# Patient Record
(demographics unavailable — no encounter records)

---

## 2025-01-16 NOTE — HISTORY OF PRESENT ILLNESS
[FreeTextEntry1] :  #[id number]  50 yo male with nephrolithiasis. States he went to an ED (Jan 8 and 10) and was found to have an obstructing stone. He has passed one large stone (~1 cm). He continues to have severe pain likely from residual stone. He does have some pain on the left. He is voiding well but states he has pain with urination.   This is his first stone event.

## 2025-01-16 NOTE — ASSESSMENT
[FreeTextEntry1] : 49-year-old male with obstructing ureteral stone.  He had passed a recent 1 cm stone.  We discussed the need for updated CT imaging to assess for any residual stones and total stone burden in order to discuss definitive stone management versus medical expulsive therapy.  Plan Urinalysis Urine culture Stone for analysis CT abd/pelvis non con given persistent severe pain to assess for residual stone size location and determine surgical planning Tamsulosin for MET - prescription sent FU after imaging   Patient is being seen today for evaluation and management of a chronic and longitudinal ongoing condition and I am of the primary treating physician.

## 2025-01-30 NOTE — ASSESSMENT
[FreeTextEntry1] : 49-year-old male with obstructing ureteral stone. He was started on tamsulosin and had a CT demonstrating only a 1 mm left intrarenal stone and possible nephrocalcinosis.  We reviewed dietary modifications for stone prevention including increased fluid intake, decreased animal proteins, increase citrus fruit, daily recommended levels of calcium and sodium.  The patient will follow-up for imaging in 8 to 12 months and if there is any significant stone growth we will discuss need for definitive stone management versus undergoing comprehensive metabolic evaluation for stone prevention including a 24-hour urine collection.  Plan Urinalysis reviewed demonstrating gross hematuria which is resolved and likely due to his obstructing stone Urine culture reviewed negative Stone for analysis reviewed: 60% calcium oxalate, 40% uric acid CT abdomen pelvis images reviewed and discussed with patient demonstrating 1 mm left intrarenal stone Stop tamsulosin Follow-up in 8 months for renal ultrasound   Patient is being seen today for evaluation and management of a chronic and longitudinal ongoing condition and I am of the primary treating physician.

## 2025-01-30 NOTE — HISTORY OF PRESENT ILLNESS
[FreeTextEntry1] :  PHIL Lakhani   48 yo male with nephrolithiasis.  He went to the ED on January 8 and 10 and was found to have a 1 cm ureteral stone.  He was started on tamsulosin and was able to pass a stone.  He notes after passing the stone he had some persistent passage of smaller stones and dust.  Currently has no symptoms.  He did undergo CT abdomen pelvis which demonstrated a 1 mm left intrarenal stone and possible nephrocalcinosis.  This is his first stone event.   We discussed dietary modifications for stone prevention

## 2025-03-28 NOTE — HISTORY OF PRESENT ILLNESS
[FreeTextEntry1] :  [id number]  48 yo male with nephrolithiasis.  He went to the ED on January 8 and 10 and was found to have a 1 cm ureteral stone.  He was started on tamsulosin and was able to pass the stone.   He went to the hosptial because he felt he had a stone but was told he had a UTI  He states he feels he has a blockage in his urinary tract like a ball. States his urinary stream is weakened and voids frequently.

## 2025-03-28 NOTE — ASSESSMENT
[FreeTextEntry1] : 49-year-old male with recurrent nephrolithiasis. He also has BPH with CT imaging demonstrating 45-50 g prostate. He recently developed UTI which has been treated but continues to have BPH symptoms. He does also note he feels as if there is a blockage within his urethra. will trial tamsulosin and fu in 1 week for cystoscopy to evaluate urethra.   Plan Urinalysis Urine culture CT imaging reviewed and discussed with patient demonstrating 45-50g prostate Trial tamsulosin 0.4 mg daily - prescription sent FU 1 week for cystoscopy    Patient is being seen today for evaluation and management of a chronic and longitudinal ongoing condition and I am of the primary treating physician.

## 2025-04-04 NOTE — ASSESSMENT
[FreeTextEntry1] : 49-year-old male with recurrent nephrolithiasis. He also has BPH with CT imaging demonstrating 45-50 g prostate. He also has irritative voiding symptoms but found to have meatal stenosis on cystoscopy which required dilation. He had brown placed. We discussed continuing with brown and fu monday for TOV. If he notes significant improvement in urine flow will discuss stopping tamsulosin   Plan Cysto done today without bladder tumors and prostate mildly obstructing Meatal stenosis dilated brown catheter placed Continue tamsulosin TOV on Monday and if notes significant improvement in flow will stop tamsulosin     Patient is being seen today for evaluation and management of a chronic and longitudinal ongoing condition and I am of the primary treating physician.

## 2025-04-04 NOTE — HISTORY OF PRESENT ILLNESS
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  He went to the ED on January 8 and 10 and was found to have a 1 cm ureteral stone.  He was started on tamsulosin and was able to pass the stone.   He went to the hosptial because he felt he had a stone but was told he had a UTI  He states he feels he has a blockage in his urinary tract like a ball. States his urinary stream is weakened and voids frequently.   He underwent cystoscopy today to evaluate and found to have a meatal stenosis requiring dilation and brown placement  he had restarted tamsulosin but has not had any improvements in urinary symptoms.

## 2025-04-04 NOTE — HISTORY OF PRESENT ILLNESS
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  He went to the ED on January 8 and 10 and was found to have a 1 cm ureteral stone.  He was started on tamsulosin and was able to pass the stone.   He went to the hosptial because he felt he had a stone but was told he had a UTI  He states he feels he has a blockage in his urinary tract like a ball. States his urinary stream is weakened and voids frequently.   He underwent cystoscopy today to evaluate and found to have a meatal stenosis requiring dilation and brown placement  he had restarted tamsulosin but has not had any improvements in urinary symptoms.

## 2025-04-07 NOTE — HISTORY OF PRESENT ILLNESS
[FreeTextEntry1] : : Louisa Vázquez RN  50 yo male with nephrolithiasis.  He went to the ED on January 8 and 10 and was found to have a 1 cm ureteral stone.  He was started on tamsulosin and was able to pass the stone.   He went to the hosptial because he felt he had a stone but was told he had a UTI  He states he feels he has a blockage in his urinary tract like a ball. States his urinary stream is weakened and voids frequently.   He underwent cystoscopy today to evaluate and found to have a meatal stenosis requiring dilation and brown placement.  He has not had any improvements before this dilation with tamsulosin.  He underwent fill and pull trial void today and was able to completely empty his bladder.

## 2025-04-07 NOTE — ASSESSMENT
[FreeTextEntry1] : 49-year-old male with history of recurrent nephrolithiasis.  We discussed dietary modifications for stone prevention.  He also has BPH with a prostate approximately 40 g which did not have any improvement with tamsulosin.  He was found on cystoscopy to have urethral meatal stenosis and underwent a dilation with Grant catheter placement.  He underwent fill and pull trial void today with complete emptying his bladder and stating that his urinary flow is better than it had been previously.  We discussed stopping his tamsulosin as his symptoms are likely from his meatal stenosis and he will apply betamethasone cream to the tip of his penis twice daily   Plan Fill and pull trial of void done today with patient emptying 400 mL of 400 mL instilled Encourage patient to increase fluid intake and frequently empty his bladder Dietary modifications for stone prevention discussed Stop tamsulosin given symptoms were more likely from his meatal stenosis Betamethasone twice daily to the tip of penis Follow-up in 6 months for PVR and assessment of symptoms    Patient is being seen today for evaluation and management of a chronic and longitudinal ongoing condition and I am of the primary treating physician.